# Patient Record
(demographics unavailable — no encounter records)

---

## 2025-04-06 NOTE — HISTORY OF PRESENT ILLNESS
[Aortic Stenosis] : no aortic stenosis [Atrial Fibrillation] : no atrial fibrillation [Recent Myocardial Infarction] : no recent myocardial infarction [Implantable Device/Pacemaker] : no implantable device/pacemaker [Family Member] : no family member with adverse anesthesia reaction/sudden death [Chronic Anticoagulation] : no chronic anticoagulation [Chronic Kidney Disease] : no chronic kidney disease [Diabetes] : no diabetes [FreeTextEntry1] : PIRADS 3 biopsy [FreeTextEntry2] : 4/15/25? [FreeTextEntry3] : Dr Keith [FreeTextEntry4] : Most pleasant very active 75-year-old white male with history of coronary artery disease, peripheral arterial disease, hypertension hyperlipidemia, elevated PSA, erectile dysfunction, who comes in for PAYTON.  Neurology updated his intracranial imaging, 2024. 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 No recurrent TIA or migrainous symptoms, 2024 imaging showed stable disease.  The patient has known drug allergies to statins. he  is not an easy bleeder, and is not anticoagulated. He  has not been on chronic immunosuppression such as chronic prednisone.He  has no history of diabetes. There is no apparent personal or family history of unprovoked VTE, malignant hyperthermia, glaucoma, or HIV/HBV/HCV. He  has no history of blood transfusion. There is no known history of sleep apnea, STOPBANG 3/8.  The patient denies signs and symptoms of active infection within the last 7 days, including: fever, shaking, chills, drenching sweats, new headache, sinus pressure, purulent rhinorhea ear ache, dental thermal sensitivity, sore throat, productive cough, new wheeze, abdominal pain, change in bowel or bladder habits such as diarrhea, dysuria. Three weeks ago, had dorsal  ankle cellutiis which responded to 2 outpt antibiotic course for 10 days. No joint pain.  The patient denies signs and symptoms of decompensated cardiopulmonary disease including new dyspnea even with exertion, wheeze, cough, exertional chest pain, PND, orthopnea, claudication, TIA or stroke.  With regards to functional capacity, patient indicates at baseline can mount 7 METS.   Last tetanus booster 2018. The patient denies dentures.  RCRI= 1points, class I risk (6% 30 day risk of death, MI, arrest), therefore no NTproBNP check indicated.  Ford/JAZMIN 0.1%  30 day risk  cardiac event, Also, would not modify his beta blockers now that the patient is within the 30 day pre operative window; given lack of evidence by history, exam, chart review for suboptimally controlled angina, heart failure.  He never been exposed to general anesthesia in adulthood.   The patient has only minor clinical predictors of increased perioperative cardiovascular risk. Therefore post op scheduled troponin, EKG surveillance is not indicated. [FreeTextEntry5] : Chaim LOVE 2009.  [FreeTextEntry7] : [facility] [place] [address] [place] [address] Phone: [phone] Fax: [phone]  Nuclear Exercise Stress Test Report  Patient:      KAMILA MURRAY                  Study Date: 12/13/2022 MRN:          [id number]                 Birth Date/Age: 7/29/1949 Age: 73 years Access #:     [id number]                     Gender: M Order Loc:    035                                 Height: 190.5 cm/ 75.0 in Request Phys: [id number] Nic Hoang               Weight: 77.11 kg/ 170.00 lb Procedure:    Rest Stress SESTAMIBI             BSA/ BMI: 2.05 m/ 21.25 kg/m Indication:   Atherosclerotic heart        Admiss Status: disease of native coronary artery without angina pectoris - I25.10  --------------------------------------------------------------------------------------------------------------------------------------------------------- Nuclear Tech.: Rossy Barriga.  --------------------------------------------------------------------------------------------------------------------------------------------------------Conclusions: 1. Images are without definite evidence of ischemia or infarction. 2. Baseline ECG: normal sinus rhythm at a rate of 52 bpm with no sigificant ST abnomalities. 3. No ischemic ST segment changes. 4. Qualitative Perfusion: - medium-sized, moderate defect(s) in the inferior wall that is fixed, totally normalizes with prone imaging suggestive of diaphragmatic attenuation artifact. 5. Patient achieved 12.8 METs, which is consistent with excellent exercise capacity. 6. The left ventricle is normal in function. 7. Blood pressure response: normal. 8. The heart rate response was normal.  --------------------------------------------------------------------------------------------------------------------------------------------------------- Stress Test Results: Protocol:           Exercise Duration: 10 min and 1 sec. METS achieved: 12.8 Stage Reached: Heart Rate:     Baseline: 52 bpm      Peak: 132 bpm (90 % max predicted). Blood Pressure: Baseline: 154/84 mmHg Peak: 170/86 mmHg  Heart Rate Response:     The heart rate response was normal. Blood Pressure Response: The blood pressure response was normal. Exercise Capacity: Patient achieved 12.8 METs, which is consistent with excellent exercise capacity. --------------------------------------------------------------------------------------------------------------------------------------------------------- Stress Symptoms: Pretest Chest Pain: None. Chest Pain: No chest pain during exercise.   +-----------------+----------------+---------------+------------+--------------+   Treadmill Time   Treadmill Speed  Treadmill Grade  Heart Rate  Blood Pressure                         (mph)                          (bpm) +-----------------+----------------+---------------+------------+--------------+                        Standing          0.0%            47          154/84 +-----------------+----------------+---------------+------------+--------------+      3:00 min            1.7              10%            69          160/84 +-----------------+----------------+---------------+------------+--------------+      6:00 min            2.5              12%            84          164/84 +-----------------+----------------+---------------+------------+--------------+      9:00 min            3.4              14%           124          170/86 +-----------------+----------------+---------------+------------+--------------+      10:00 min           4.2              16%           170          190/86 +-----------------+----------------+---------------+------------+--------------+  Recovery 1:00 min ---------------- ---------------      58          140/81 +-----------------+----------------+---------------+------------+--------------+   ---------------------------------------------------------------------------------------------------------------------------------------------------------ECG: Baseline ECG: Normal sinus rhythm at a rate of 52 bpm with no sigificant ST abnomalities. Stress ECG: No ischemic ST segment changes.  --------------------------------------------------------------------------------------------------------------------------------------------------------- Procedure: The patient was given 10.9 mCi of TC-99M Sestamibi intravenously at rest. Approximately 45 minutes later, SPECT images were obtained, with patient in supine position. 31.1 mCi of TC-99M Sestamibi was given intravenously at stress. After 30 minutes, SPECT images were obtained and assessed for myocardial perfusion, with patient in supine position. Images were reacquired with the patient in a prone position. --------------------------------------------------------------------------------------------------------------------------------------------------------- Perfusion: Qualitative Findings:  There is a medium-sized, moderate defect(s) in the inferior wall that is fixed, totally normalizes with prone imaging suggestive of diaphragmatic attenuation artifact.  Regional Wall Motion: The stress left ventricular EF% is 64 %.  Ventricular Size and Global Function: Left Ventricular function at stress:The end diastolic volume is 80 ml. The end systolic volume is 29 ml. The left ventricle is normal in function.  Interpreting Provider: Electronically signed by Ferdinand Anne MD, FACC on 10:58:10 AM   *** Final *** EXAM:  CT ANGIO HEART CORONARY IC   PROCEDURE DATE:  01/08/2021     INTERPRETATION:  Indication:  71-year-old man with CAD s/p PCI; evaluate for coronary artery disease progression.  Procedure:  Informed consent was obtained from the patient and there were no complications during the procedure. Noncontrast ECG-gated 320-slice computed tomography of the heart was performed followed by a contrast injection 75 cc's of Omnipaque 350 for ECG-gated coronary CT angiography. The patient was pretreated with Metoprolol and 0.8 mg of sublingual Nitroglycerin. Resting heart rate at the time of examination was 50 beats/min. Axial two- and three-dimensional images were reconstructed using a [employer] Workstation. The image quality is technically difficult due to blooming artifact and reduced contrast opacification.  FINDINGS:  Non-Coronary: Heart size appears normal.  Mild aortic valve calcification is noted.  No thoracic aortic dissection is noted in the visualized thoracic aorta.  Coronary artery calcium score quantification not performed as patient has prior history of PCI/stent.  Coronary: Right-dominant coronary circulation. The left main coronary artery contains mostly calcified plaque with an indeterminate degree of luminal stenosis.  Blooming artifact from calcium limits accurate assessment of degree of luminal stenosis.   Cannot exclude significant stenosis in this vessel. The left anterior descending coronary artery contains mostly calcified plaque in the ostial and proximal segments with likely mild (less than 50%) luminal stenosis.  There is mostly calcified plaque in one portion of the mid LAD with an indeterminate degree of luminal stenosis.  Blooming artifact from calcium limits accurate assessment.  A stent is noted in the mid LAD.  Stent-associated metal artifact limits assessment of patency.  Cannot exclude stenosis within the stent.  The distal LAD and the visualized diagonal branch contain mostly calcified plaque with an indeterminate degree of luminal stenosis.  Blooming artifact from calcium limits accurate assessment.  Cannot exclude significant stenosis in these vessels. The left circumflex coronary artery contains mostly calcified plaque at the ostium with minimal (less than 30%) luminal stenosis.  The distal LCX is very small in caliber and was not well visualized.  The first visualized obtuse marginal branch contains luminal irregularities but is patent.  The second OM branch contains mixed plaque with likely mild (less than 50%) luminal stenosis. The right coronary artery contains mixed plaque in the proximal to mid segment with likely moderate (approximately 50-69%) luminal stenosis, although blooming artifact from calcium limits precise assessment.  The remainder of the RCA contains luminal irregularities but appears patent.  The right posterior descending artery is very small in caliber and was not well visualized.  IMPRESSION: Technically difficult study for coronary artery assessment due to blooming artifact from calcium/stent and reduced contrast opacification.  1. Coronaries: --LM: contains mostly calcified plaque with an indeterminate degree of luminal stenosis.  Blooming artifact from calcium limits accurate assessment of degree of luminal stenosis.   Cannot exclude significant stenosis in this vessel.  --LAD: contains mostly calcified plaque in the ostial and proximal segments with likely mild (less than 50%) luminal stenosis.  There is mostly calcified plaque in one portion of the mid LAD with an indeterminate degree of luminal stenosis.  Blooming artifact from calcium limits accurate assessment.  A stent is noted in the mid LAD.  Stent-associated metal artifact limits assessment of patency.  Cannot exclude stenosis within the stent.  The distal LAD and the visualized diagonal branch contain mostly calcified plaque with an indeterminate degree of luminal stenosis.  Blooming artifact from calcium limits accurate assessment.  Cannot exclude significant stenosis in these vessels.  --LCX: contains mostly calcified plaque at the ostium with minimal (less than 30%) luminal stenosis.  The distal LCX is very small in caliber and was not well visualized.  The second OM branch contains mixed plaque with likely mild (less than 50%) luminal stenosis.  --RCA: contains mixed plaque in the proximal to mid segment with likely moderate (approximately 50-69%) luminal stenosis, although blooming artifact from calcium limits precise assessment.  The RPDA is very small in caliber and was not well visualized.  Results discussed with Dr. Hoang on 1/8/2021 at 2:20 PM.

## 2025-04-06 NOTE — ASSESSMENT
[Patient Optimized for Surgery] : Patient optimized for surgery [FreeTextEntry4] : Patient without signs or symptoms of active infection decompensated cardiopulmonary disease currently by history and exam; has excellent functional capacity.  Defer EKG to cardiology, CBC CMP BNP coags urinalysis with micro and culture identified no concerns. Low BNP indicates no strong indication for post op trops, EKGs.  Primary question is whether urology can do biopsy with aspirin on board, or whether he needs to be off both Plavix and aspirin for at least 5 to 10 days preprocedure.  Patient sees cardiology tomorrow. [FreeTextEntry6] : Confirm w cardiology pt ok to hold plavix 5 days before surgery.(baby ECASA OK, just hold AM of surg)

## 2025-04-06 NOTE — HISTORY OF PRESENT ILLNESS
[Aortic Stenosis] : no aortic stenosis [Atrial Fibrillation] : no atrial fibrillation [Recent Myocardial Infarction] : no recent myocardial infarction [Implantable Device/Pacemaker] : no implantable device/pacemaker [Family Member] : no family member with adverse anesthesia reaction/sudden death [Chronic Anticoagulation] : no chronic anticoagulation [Chronic Kidney Disease] : no chronic kidney disease [Diabetes] : no diabetes [FreeTextEntry1] : PIRADS 3 biopsy [FreeTextEntry2] : 4/15/25? [FreeTextEntry4] : Most pleasant very active 75-year-old white male with history of coronary artery disease, peripheral arterial disease, hypertension hyperlipidemia, elevated PSA, erectile dysfunction, who comes in for PAYTON.  Neurology updated his intracranial imaging, 2024. 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 No recurrent TIA or migrainous symptoms, 2024 imaging showed stable disease.  The patient has known drug allergies to statins. he  is not an easy bleeder, and is not anticoagulated. He  has not been on chronic immunosuppression such as chronic prednisone.He  has no history of diabetes. There is no apparent personal or family history of unprovoked VTE, malignant hyperthermia, glaucoma, or HIV/HBV/HCV. He  has no history of blood transfusion. There is no known history of sleep apnea, STOPBANG 3/8.  The patient denies signs and symptoms of active infection within the last 7 days, including: fever, shaking, chills, drenching sweats, new headache, sinus pressure, purulent rhinorhea ear ache, dental thermal sensitivity, sore throat, productive cough, new wheeze, abdominal pain, change in bowel or bladder habits such as diarrhea, dysuria. Three weeks ago, had dorsal  ankle cellutiis which responded to 2 outpt antibiotic course for 10 days. No joint pain.  The patient denies signs and symptoms of decompensated cardiopulmonary disease including new dyspnea even with exertion, wheeze, cough, exertional chest pain, PND, orthopnea, claudication, TIA or stroke.  With regards to functional capacity, patient indicates at baseline can mount 7 METS.   Last tetanus booster 2018. The patient denies dentures.  RCRI= 1points, class I risk (6% 30 day risk of death, MI, arrest), therefore no NTproBNP check indicated.  Ford/JAZMIN 0.1%  30 day risk  cardiac event, Also, would not modify his beta blockers now that the patient is within the 30 day pre operative window; given lack of evidence by history, exam, chart review for suboptimally controlled angina, heart failure.  He never been exposed to general anesthesia in adulthood.   The patient has only minor clinical predictors of increased perioperative cardiovascular risk. Therefore post op scheduled troponin, EKG surveillance is not indicated. [FreeTextEntry3] : Dr Keith [FreeTextEntry5] : Chaim LOVE 2009.  [FreeTextEntry7] : [facility] at [place] [address] [place] [address] Phone: [phone] Fax: [phone]  Nuclear Exercise Stress Test Report  Patient:      KAMILA MURRAY                  Study Date: 12/13/2022 MRN:          [id number]                 Birth Date/Age: 7/29/1949 Age: 73 years Access #:     [id number]                     Gender: M Order Loc:    035                                 Height: 190.5 cm/ 75.0 in Request Phys: [id number] Nic Hoang               Weight: 77.11 kg/ 170.00 lb Procedure:    Rest Stress SESTAMIBI             BSA/ BMI: 2.05 m/ 21.25 kg/m Indication:   Atherosclerotic heart        Admiss Status: disease of native coronary artery without angina pectoris - I25.10  --------------------------------------------------------------------------------------------------------------------------------------------------------- Nuclear Tech.: Rossy Barriga.  --------------------------------------------------------------------------------------------------------------------------------------------------------Conclusions: 1. Images are without definite evidence of ischemia or infarction. 2. Baseline ECG: normal sinus rhythm at a rate of 52 bpm with no sigificant ST abnomalities. 3. No ischemic ST segment changes. 4. Qualitative Perfusion: - medium-sized, moderate defect(s) in the inferior wall that is fixed, totally normalizes with prone imaging suggestive of diaphragmatic attenuation artifact. 5. Patient achieved 12.8 METs, which is consistent with excellent exercise capacity. 6. The left ventricle is normal in function. 7. Blood pressure response: normal. 8. The heart rate response was normal.  --------------------------------------------------------------------------------------------------------------------------------------------------------- Stress Test Results: Protocol:           Exercise Duration: 10 min and 1 sec. METS achieved: 12.8 Stage Reached: Heart Rate:     Baseline: 52 bpm      Peak: 132 bpm (90 % max predicted). Blood Pressure: Baseline: 154/84 mmHg Peak: 170/86 mmHg  Heart Rate Response:     The heart rate response was normal. Blood Pressure Response: The blood pressure response was normal. Exercise Capacity: Patient achieved 12.8 METs, which is consistent with excellent exercise capacity. --------------------------------------------------------------------------------------------------------------------------------------------------------- Stress Symptoms: Pretest Chest Pain: None. Chest Pain: No chest pain during exercise.   +-----------------+----------------+---------------+------------+--------------+   Treadmill Time   Treadmill Speed  Treadmill Grade  Heart Rate  Blood Pressure                         (mph)                          (bpm) +-----------------+----------------+---------------+------------+--------------+                        Standing          0.0%            47          154/84 +-----------------+----------------+---------------+------------+--------------+      3:00 min            1.7              10%            69          160/84 +-----------------+----------------+---------------+------------+--------------+      6:00 min            2.5              12%            84          164/84 +-----------------+----------------+---------------+------------+--------------+      9:00 min            3.4              14%           124          170/86 +-----------------+----------------+---------------+------------+--------------+      10:00 min           4.2              16%           170          190/86 +-----------------+----------------+---------------+------------+--------------+  Recovery 1:00 min ---------------- ---------------      58          140/81 +-----------------+----------------+---------------+------------+--------------+   ---------------------------------------------------------------------------------------------------------------------------------------------------------ECG: Baseline ECG: Normal sinus rhythm at a rate of 52 bpm with no sigificant ST abnomalities. Stress ECG: No ischemic ST segment changes.  --------------------------------------------------------------------------------------------------------------------------------------------------------- Procedure: The patient was given 10.9 mCi of TC-99M Sestamibi intravenously at rest. Approximately 45 minutes later, SPECT images were obtained, with patient in supine position. 31.1 mCi of TC-99M Sestamibi was given intravenously at stress. After 30 minutes, SPECT images were obtained and assessed for myocardial perfusion, with patient in supine position. Images were reacquired with the patient in a prone position. --------------------------------------------------------------------------------------------------------------------------------------------------------- Perfusion: Qualitative Findings:  There is a medium-sized, moderate defect(s) in the inferior wall that is fixed, totally normalizes with prone imaging suggestive of diaphragmatic attenuation artifact.  Regional Wall Motion: The stress left ventricular EF% is 64 %.  Ventricular Size and Global Function: Left Ventricular function at stress:The end diastolic volume is 80 ml. The end systolic volume is 29 ml. The left ventricle is normal in function.  Interpreting Provider: Electronically signed by Ferdiannd Anne MD, FACC on 10:58:10 AM   *** Final *** EXAM:  CT ANGIO HEART CORONARY IC   PROCEDURE DATE:  01/08/2021     INTERPRETATION:  Indication:  71-year-old man with CAD s/p PCI; evaluate for coronary artery disease progression.  Procedure:  Informed consent was obtained from the patient and there were no complications during the procedure. Noncontrast ECG-gated 320-slice computed tomography of the heart was performed followed by a contrast injection 75 cc's of Omnipaque 350 for ECG-gated coronary CT angiography. The patient was pretreated with Metoprolol and 0.8 mg of sublingual Nitroglycerin. Resting heart rate at the time of examination was 50 beats/min. Axial two- and three-dimensional images were reconstructed using a [employer] Workstation. The image quality is technically difficult due to blooming artifact and reduced contrast opacification.  FINDINGS:  Non-Coronary: Heart size appears normal.  Mild aortic valve calcification is noted.  No thoracic aortic dissection is noted in the visualized thoracic aorta.  Coronary artery calcium score quantification not performed as patient has prior history of PCI/stent.  Coronary: Right-dominant coronary circulation. The left main coronary artery contains mostly calcified plaque with an indeterminate degree of luminal stenosis.  Blooming artifact from calcium limits accurate assessment of degree of luminal stenosis.   Cannot exclude significant stenosis in this vessel. The left anterior descending coronary artery contains mostly calcified plaque in the ostial and proximal segments with likely mild (less than 50%) luminal stenosis.  There is mostly calcified plaque in one portion of the mid LAD with an indeterminate degree of luminal stenosis.  Blooming artifact from calcium limits accurate assessment.  A stent is noted in the mid LAD.  Stent-associated metal artifact limits assessment of patency.  Cannot exclude stenosis within the stent.  The distal LAD and the visualized diagonal branch contain mostly calcified plaque with an indeterminate degree of luminal stenosis.  Blooming artifact from calcium limits accurate assessment.  Cannot exclude significant stenosis in these vessels. The left circumflex coronary artery contains mostly calcified plaque at the ostium with minimal (less than 30%) luminal stenosis.  The distal LCX is very small in caliber and was not well visualized.  The first visualized obtuse marginal branch contains luminal irregularities but is patent.  The second OM branch contains mixed plaque with likely mild (less than 50%) luminal stenosis. The right coronary artery contains mixed plaque in the proximal to mid segment with likely moderate (approximately 50-69%) luminal stenosis, although blooming artifact from calcium limits precise assessment.  The remainder of the RCA contains luminal irregularities but appears patent.  The right posterior descending artery is very small in caliber and was not well visualized.  IMPRESSION: Technically difficult study for coronary artery assessment due to blooming artifact from calcium/stent and reduced contrast opacification.  1. Coronaries: --LM: contains mostly calcified plaque with an indeterminate degree of luminal stenosis.  Blooming artifact from calcium limits accurate assessment of degree of luminal stenosis.   Cannot exclude significant stenosis in this vessel.  --LAD: contains mostly calcified plaque in the ostial and proximal segments with likely mild (less than 50%) luminal stenosis.  There is mostly calcified plaque in one portion of the mid LAD with an indeterminate degree of luminal stenosis.  Blooming artifact from calcium limits accurate assessment.  A stent is noted in the mid LAD.  Stent-associated metal artifact limits assessment of patency.  Cannot exclude stenosis within the stent.  The distal LAD and the visualized diagonal branch contain mostly calcified plaque with an indeterminate degree of luminal stenosis.  Blooming artifact from calcium limits accurate assessment.  Cannot exclude significant stenosis in these vessels.  --LCX: contains mostly calcified plaque at the ostium with minimal (less than 30%) luminal stenosis.  The distal LCX is very small in caliber and was not well visualized.  The second OM branch contains mixed plaque with likely mild (less than 50%) luminal stenosis.  --RCA: contains mixed plaque in the proximal to mid segment with likely moderate (approximately 50-69%) luminal stenosis, although blooming artifact from calcium limits precise assessment.  The RPDA is very small in caliber and was not well visualized.  Results discussed with Dr. Hoang on 1/8/2021 at 2:20 PM.

## 2025-04-06 NOTE — HISTORY OF PRESENT ILLNESS
[Aortic Stenosis] : no aortic stenosis [Atrial Fibrillation] : no atrial fibrillation [Recent Myocardial Infarction] : no recent myocardial infarction [Implantable Device/Pacemaker] : no implantable device/pacemaker [Family Member] : no family member with adverse anesthesia reaction/sudden death [Chronic Anticoagulation] : no chronic anticoagulation [Chronic Kidney Disease] : no chronic kidney disease [Diabetes] : no diabetes [FreeTextEntry1] : PIRADS 3 biopsy [FreeTextEntry2] : 4/15/25? [FreeTextEntry4] : Most pleasant very active 75-year-old white male with history of coronary artery disease, peripheral arterial disease, hypertension hyperlipidemia, elevated PSA, erectile dysfunction, who comes in for PAYTON.  Neurology updated his intracranial imaging, 2024. 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 No recurrent TIA or migrainous symptoms, 2024 imaging showed stable disease.  The patient has known drug allergies to statins. he  is not an easy bleeder, and is not anticoagulated. He  has not been on chronic immunosuppression such as chronic prednisone.He  has no history of diabetes. There is no apparent personal or family history of unprovoked VTE, malignant hyperthermia, glaucoma, or HIV/HBV/HCV. He  has no history of blood transfusion. There is no known history of sleep apnea, STOPBANG 3/8.  The patient denies signs and symptoms of active infection within the last 7 days, including: fever, shaking, chills, drenching sweats, new headache, sinus pressure, purulent rhinorhea ear ache, dental thermal sensitivity, sore throat, productive cough, new wheeze, abdominal pain, change in bowel or bladder habits such as diarrhea, dysuria. Three weeks ago, had dorsal  ankle cellutiis which responded to 2 outpt antibiotic course for 10 days. No joint pain.  The patient denies signs and symptoms of decompensated cardiopulmonary disease including new dyspnea even with exertion, wheeze, cough, exertional chest pain, PND, orthopnea, claudication, TIA or stroke.  With regards to functional capacity, patient indicates at baseline can mount 7 METS.   Last tetanus booster 2018. The patient denies dentures.  RCRI= 1points, class I risk (6% 30 day risk of death, MI, arrest), therefore no NTproBNP check indicated.  Ford/JAZMIN 0.1%  30 day risk  cardiac event, Also, would not modify his beta blockers now that the patient is within the 30 day pre operative window; given lack of evidence by history, exam, chart review for suboptimally controlled angina, heart failure.  He never been exposed to general anesthesia in adulthood.   The patient has only minor clinical predictors of increased perioperative cardiovascular risk. Therefore post op scheduled troponin, EKG surveillance is not indicated. [FreeTextEntry3] : Dr Keith [FreeTextEntry5] : Chaim LOVE 2009.  [FreeTextEntry7] : [facility] [place] [address] [place] [address] Phone: [phone] Fax: [phone]  Nuclear Exercise Stress Test Report  Patient:      KAMILA MURRAY                  Study Date: 12/13/2022 MRN:          [id number]                 Birth Date/Age: 7/29/1949 Age: 73 years Access #:     [id number]                     Gender: M Order Loc:    035                                 Height: 190.5 cm/ 75.0 in Request Phys: [id number] Nic Hoang               Weight: 77.11 kg/ 170.00 lb Procedure:    Rest Stress SESTAMIBI             BSA/ BMI: 2.05 m/ 21.25 kg/m Indication:   Atherosclerotic heart        Admiss Status: disease of native coronary artery without angina pectoris - I25.10  --------------------------------------------------------------------------------------------------------------------------------------------------------- Nuclear Tech.: Rossy Barriga.  --------------------------------------------------------------------------------------------------------------------------------------------------------Conclusions: 1. Images are without definite evidence of ischemia or infarction. 2. Baseline ECG: normal sinus rhythm at a rate of 52 bpm with no sigificant ST abnomalities. 3. No ischemic ST segment changes. 4. Qualitative Perfusion: - medium-sized, moderate defect(s) in the inferior wall that is fixed, totally normalizes with prone imaging suggestive of diaphragmatic attenuation artifact. 5. Patient achieved 12.8 METs, which is consistent with excellent exercise capacity. 6. The left ventricle is normal in function. 7. Blood pressure response: normal. 8. The heart rate response was normal.  --------------------------------------------------------------------------------------------------------------------------------------------------------- Stress Test Results: Protocol:           Exercise Duration: 10 min and 1 sec. METS achieved: 12.8 Stage Reached: Heart Rate:     Baseline: 52 bpm      Peak: 132 bpm (90 % max predicted). Blood Pressure: Baseline: 154/84 mmHg Peak: 170/86 mmHg  Heart Rate Response:     The heart rate response was normal. Blood Pressure Response: The blood pressure response was normal. Exercise Capacity: Patient achieved 12.8 METs, which is consistent with excellent exercise capacity. --------------------------------------------------------------------------------------------------------------------------------------------------------- Stress Symptoms: Pretest Chest Pain: None. Chest Pain: No chest pain during exercise.   +-----------------+----------------+---------------+------------+--------------+   Treadmill Time   Treadmill Speed  Treadmill Grade  Heart Rate  Blood Pressure                         (mph)                          (bpm) +-----------------+----------------+---------------+------------+--------------+                        Standing          0.0%            47          154/84 +-----------------+----------------+---------------+------------+--------------+      3:00 min            1.7              10%            69          160/84 +-----------------+----------------+---------------+------------+--------------+      6:00 min            2.5              12%            84          164/84 +-----------------+----------------+---------------+------------+--------------+      9:00 min            3.4              14%           124          170/86 +-----------------+----------------+---------------+------------+--------------+      10:00 min           4.2              16%           170          190/86 +-----------------+----------------+---------------+------------+--------------+  Recovery 1:00 min ---------------- ---------------      58          140/81 +-----------------+----------------+---------------+------------+--------------+   ---------------------------------------------------------------------------------------------------------------------------------------------------------ECG: Baseline ECG: Normal sinus rhythm at a rate of 52 bpm with no sigificant ST abnomalities. Stress ECG: No ischemic ST segment changes.  --------------------------------------------------------------------------------------------------------------------------------------------------------- Procedure: The patient was given 10.9 mCi of TC-99M Sestamibi intravenously at rest. Approximately 45 minutes later, SPECT images were obtained, with patient in supine position. 31.1 mCi of TC-99M Sestamibi was given intravenously at stress. After 30 minutes, SPECT images were obtained and assessed for myocardial perfusion, with patient in supine position. Images were reacquired with the patient in a prone position. --------------------------------------------------------------------------------------------------------------------------------------------------------- Perfusion: Qualitative Findings:  There is a medium-sized, moderate defect(s) in the inferior wall that is fixed, totally normalizes with prone imaging suggestive of diaphragmatic attenuation artifact.  Regional Wall Motion: The stress left ventricular EF% is 64 %.  Ventricular Size and Global Function: Left Ventricular function at stress:The end diastolic volume is 80 ml. The end systolic volume is 29 ml. The left ventricle is normal in function.  Interpreting Provider: Electronically signed by Ferdinand Anne MD, FACC on 10:58:10 AM   *** Final *** EXAM:  CT ANGIO HEART CORONARY IC   PROCEDURE DATE:  01/08/2021     INTERPRETATION:  Indication:  71-year-old man with CAD s/p PCI; evaluate for coronary artery disease progression.  Procedure:  Informed consent was obtained from the patient and there were no complications during the procedure. Noncontrast ECG-gated 320-slice computed tomography of the heart was performed followed by a contrast injection 75 cc's of Omnipaque 350 for ECG-gated coronary CT angiography. The patient was pretreated with Metoprolol and 0.8 mg of sublingual Nitroglycerin. Resting heart rate at the time of examination was 50 beats/min. Axial two- and three-dimensional images were reconstructed using a [employer] Workstation. The image quality is technically difficult due to blooming artifact and reduced contrast opacification.  FINDINGS:  Non-Coronary: Heart size appears normal.  Mild aortic valve calcification is noted.  No thoracic aortic dissection is noted in the visualized thoracic aorta.  Coronary artery calcium score quantification not performed as patient has prior history of PCI/stent.  Coronary: Right-dominant coronary circulation. The left main coronary artery contains mostly calcified plaque with an indeterminate degree of luminal stenosis.  Blooming artifact from calcium limits accurate assessment of degree of luminal stenosis.   Cannot exclude significant stenosis in this vessel. The left anterior descending coronary artery contains mostly calcified plaque in the ostial and proximal segments with likely mild (less than 50%) luminal stenosis.  There is mostly calcified plaque in one portion of the mid LAD with an indeterminate degree of luminal stenosis.  Blooming artifact from calcium limits accurate assessment.  A stent is noted in the mid LAD.  Stent-associated metal artifact limits assessment of patency.  Cannot exclude stenosis within the stent.  The distal LAD and the visualized diagonal branch contain mostly calcified plaque with an indeterminate degree of luminal stenosis.  Blooming artifact from calcium limits accurate assessment.  Cannot exclude significant stenosis in these vessels. The left circumflex coronary artery contains mostly calcified plaque at the ostium with minimal (less than 30%) luminal stenosis.  The distal LCX is very small in caliber and was not well visualized.  The first visualized obtuse marginal branch contains luminal irregularities but is patent.  The second OM branch contains mixed plaque with likely mild (less than 50%) luminal stenosis. The right coronary artery contains mixed plaque in the proximal to mid segment with likely moderate (approximately 50-69%) luminal stenosis, although blooming artifact from calcium limits precise assessment.  The remainder of the RCA contains luminal irregularities but appears patent.  The right posterior descending artery is very small in caliber and was not well visualized.  IMPRESSION: Technically difficult study for coronary artery assessment due to blooming artifact from calcium/stent and reduced contrast opacification.  1. Coronaries: --LM: contains mostly calcified plaque with an indeterminate degree of luminal stenosis.  Blooming artifact from calcium limits accurate assessment of degree of luminal stenosis.   Cannot exclude significant stenosis in this vessel.  --LAD: contains mostly calcified plaque in the ostial and proximal segments with likely mild (less than 50%) luminal stenosis.  There is mostly calcified plaque in one portion of the mid LAD with an indeterminate degree of luminal stenosis.  Blooming artifact from calcium limits accurate assessment.  A stent is noted in the mid LAD.  Stent-associated metal artifact limits assessment of patency.  Cannot exclude stenosis within the stent.  The distal LAD and the visualized diagonal branch contain mostly calcified plaque with an indeterminate degree of luminal stenosis.  Blooming artifact from calcium limits accurate assessment.  Cannot exclude significant stenosis in these vessels.  --LCX: contains mostly calcified plaque at the ostium with minimal (less than 30%) luminal stenosis.  The distal LCX is very small in caliber and was not well visualized.  The second OM branch contains mixed plaque with likely mild (less than 50%) luminal stenosis.  --RCA: contains mixed plaque in the proximal to mid segment with likely moderate (approximately 50-69%) luminal stenosis, although blooming artifact from calcium limits precise assessment.  The RPDA is very small in caliber and was not well visualized.  Results discussed with Dr. Hoang on 1/8/2021 at 2:20 PM.

## 2025-04-10 NOTE — HISTORY OF PRESENT ILLNESS
[FreeTextEntry1] :  KAMILA MURRAY comes today for evaluation. he was advised to undergo a complete cardiac evaluation. He denies chest pains shortness of breath or loss of consciousness. He has had a rise in PSA and a prostate biopsy is planned.

## 2025-04-10 NOTE — DISCUSSION/SUMMARY
[de-identified] : labetalol 100 BID if blood pressure remains elevated greater than 150 his wife and will check and blood pressure readings and report [FreeTextEntry1] : Without evidence or recent infarction overt heart failure or unstable angina and based upon a satisfactory EKG and clinical exam. Mr. Manuel may undergo planned prostate biopsy which constitutes a low risk procedure in a patient with intermediate cardiac risk at an ASA class II anesthesia risk. He may discontinue clopidogrel now over 1-year since a cardiac stent and without evidence for advanced peripheral vascular disease.  Will continue aspirin 81mg po perioperatively given a prior cardiac stent and lifelong if no surgical contraindications    Medical necessity This is a high encounter based upon two or more chronic illnesses with mild exacerbation requiring further drug management and evaluation.   EKG is indicated for evaluation of Preop cardiovascular risk  risks benefits alternatives were discussed with the patient. all questions were answered to His satisfaction.

## 2025-04-10 NOTE — DISCUSSION/SUMMARY
[de-identified] : labetalol 100 BID if blood pressure remains elevated greater than 150 his wife and will check and blood pressure readings and report [FreeTextEntry1] : Without evidence or recent infarction overt heart failure or unstable angina and based upon a satisfactory EKG and clinical exam. Mr. Manuel may undergo planned prostate biopsy which constitutes a low risk procedure in a patient with intermediate cardiac risk at an ASA class II anesthesia risk. He may discontinue clopidogrel now over 1-year since a cardiac stent and without evidence for advanced peripheral vascular disease.  Will continue aspirin 81mg po perioperatively given a prior cardiac stent and lifelong if no surgical contraindications    Medical necessity This is a high encounter based upon two or more chronic illnesses with mild exacerbation requiring further drug management and evaluation.   EKG is indicated for evaluation of Preop cardiovascular risk  risks benefits alternatives were discussed with the patient. all questions were answered to His satisfaction.

## 2025-04-10 NOTE — REASON FOR VISIT
[FreeTextEntry1] : Yeison is doing well overall stress tested a satisfactory we discussed the remote history of possible TIA and intricate tachycardia of bloodwork as requested we also note hypertension and reconsider transition to labetalol. at night it a brief episode of chest heaviness\par  \par  Update 11/11/2022\par  No new complaints for Yeison TIA symptoms have resolved consider update of ischemic risk. changes at a wedding and had dismount exertion while walking uphill and we wonder about recurrent ischemic disease given remote cardiac stent

## 2025-05-13 NOTE — REASON FOR VISIT
[Hypertension] : hypertension [FreeTextEntry1] : Yeison is doing well overall stress tested a satisfactory we discussed the remote history of possible TIA and intricate tachycardia of bloodwork as requested we also note hypertension and reconsider transition to labetalol. at night it a brief episode of chest heaviness  Update 11/11/2022 No new complaints for Yeison TIA symptoms have resolved consider update of ischemic risk. changes at a wedding and had dismount exertion while walking uphill and we wonder about recurrent ischemic disease given remote cardiac stent  5/13/2025  Yeison was seen by urology Dr. Walters at [facility] undergoing radiation for 35 treatments and testosterone supplementation for 1 year.  It was suggested that he undergo calcium score based upon calcification found externally

## 2025-05-13 NOTE — DISCUSSION/SUMMARY
[Essential Hypertension] : essential hypertension [Not Responding to Treatment] : not responding to treatment [Medication Changes Per Orders] : Medication changes are as documented in orders [de-identified] : labetalol 100 BID if blood pressure remains elevated greater than 150 his wife and will check and blood pressure readings and report [FreeTextEntry1] : Without evidence or recent infarction overt heart failure or unstable angina and based upon a satisfactory EKG and clinical exam. Mr. Manuel may undergo planned prostate biopsy which constitutes a low risk procedure in a patient with intermediate cardiac risk at an ASA class II anesthesia risk. He may discontinue clopidogrel now over 1-year since a cardiac stent and without evidence for advanced peripheral vascular disease.  Will continue aspirin 81mg po perioperatively given a prior cardiac stent and lifelong if no surgical contraindications  5/13/2025 we discussed the pros and cons of testosterone the benefit probably outweighs the risk at this point given a Milton stage VIII prostate cancer. would also consider cardiac CTA given a 50 to 69% RCA lesion 4 years ago in order to update risk. plain treadmill stress testing may be insensitive to the right coronary as nuclear testing.     Medical necessity This is a high encounter based upon two or more chronic illnesses with mild exacerbation requiring further drug management and evaluation.   EKG is indicated for evaluation of Preop cardiovascular risk  risks benefits alternatives were discussed with the patient. all questions were answered to His satisfaction. [EKG obtained to assist in diagnosis and management of assessed problem(s)] : EKG obtained to assist in diagnosis and management of assessed problem(s)

## 2025-05-13 NOTE — HISTORY OF PRESENT ILLNESS
[Preoperative Visit] : for a medical evaluation prior to surgery [Scheduled Procedure ___] : a [unfilled] [Date of Surgery ___] : on [unfilled] [Surgeon Name ___] : surgeon: [unfilled] [Stable] : Stable [FreeTextEntry1] :  KAMILA MURRAY comes today for evaluation. he was advised to undergo a complete cardiac evaluation. He denies chest pains shortness of breath or loss of consciousness. He has had a rise in PSA and a prostate biopsy is planned.

## 2025-07-16 NOTE — HISTORY OF PRESENT ILLNESS
[FreeTextEntry1] : Febrile illness [de-identified] : Most pleasant 75-year-old white male with history of coronary artery disease, peripheral arterial disease, hypertension hyperlipidemia, erectile dysfunction, Prostate cancer on Orgovyx with external beam radiation therapy in the near future presents with 2-day history of fever to 103.1, 3 loose nonbloody bowel movements a day that seemingly relieves his periumbilical discomfort, found to be mildly hypotensive, down 4 pounds from baseline weight, even mildly hypoxic.  He is lightheaded with upright position.  He denies vomiting nausea but endorses anorexia.  No sick contacts though has seen grandchildren recently.  Has not had antibiotics in the past few months to his recollection.  No recent travel

## 2025-07-16 NOTE — PHYSICAL EXAM
[Normal] : normal rate, regular rhythm, normal S1 and S2 and no murmur heard [de-identified] : Looks ill, tired [de-identified] : Decreased breath sounds in the lung bases.  No crackles or wheeze. [de-identified] : Slightly hyperactive bowel sounds, nontender. No guarding. Negative [place] sign.

## 2025-07-16 NOTE — ASSESSMENT
[FreeTextEntry1] : *Febrile illness without clear localizing signs.  Fever to 103, relative hypotension, relative hypoxia, loosening of stool. Need rule out sepsis, recommend CT abdomen pelvis IV fluids urinalysis chest x-ray blood cultures CBC procalcitonin BNP D-dimer.Called report to ED  Time 15 minutes

## 2025-07-23 NOTE — ASSESSMENT
[FreeTextEntry1] : *Campylobacter ania enterocolitis. Raw clams likely culprit. Finish Azithromycin 500mg daily 12 day course, taking orgovyx at night macrolide in AM. EKG today without QTc prolongation. Update CBC CMP given recent abnormalities, and exclude macrolide associated electrolyte abnormalities. Recommend 2 weeks align, extending past abx course. Time 25 min

## 2025-07-23 NOTE — HISTORY OF PRESENT ILLNESS
[Post-hospitalization from ___ Hospital] : Post-hospitalization from [unfilled] Hospital [Admitted on: ___] : The patient was admitted on [unfilled] [Discharged on ___] : discharged on [unfilled] [Pertinent Labs] : pertinent labs [Radiology Findings] : radiology findings [Other: ____] : [unfilled] [FreeTextEntry2] : Most pleasant 75-year-old white male with history of coronary artery disease, peripheral arterial disease, hypertension hyperlipidemia, erectile dysfunction, Prostate cancer on Orgovyx with external beam radiation therapy in the near future presents with 2-day history of fever to 103.1, 3 loose nonbloody bowel movements a day that seemingly relieves his periumbilical discomfort, found to be mildly hypotensive, down 4 pounds from baseline weight, even mildly hypoxic,Sent to ER after being seen in the clinic on July 16.  CT showed diffuse colon swelling consistent with enterocolitis, patient was started on Augmentin.  He was called back to the hospital and hospitalized overnight July 20 through 21 when 1 out of 4 bottles grew out Campylobacter Melissa.  Repeat blood cultures on the 20th remain no growth at this time.Labs over the course of ED visit and hospitalization notable for 3 g hemoglobin drop, mild neutrophilia, Modest transaminitis, hypoalbuminemia down to 2.6 baseline 4.4 and eosinophilia.  Fever has since resolved.  He is back to his twice a week stooling habit.  Denies abdominal pain.  Appetite good.